# Patient Record
Sex: FEMALE | Race: WHITE | NOT HISPANIC OR LATINO | URBAN - METROPOLITAN AREA
[De-identification: names, ages, dates, MRNs, and addresses within clinical notes are randomized per-mention and may not be internally consistent; named-entity substitution may affect disease eponyms.]

---

## 2017-12-09 ENCOUNTER — EMERGENCY (EMERGENCY)
Facility: HOSPITAL | Age: 67
LOS: 1 days | Discharge: HOME | End: 2017-12-09
Admitting: EMERGENCY MEDICINE
Payer: MEDICARE

## 2017-12-09 VITALS
OXYGEN SATURATION: 100 % | SYSTOLIC BLOOD PRESSURE: 125 MMHG | RESPIRATION RATE: 18 BRPM | TEMPERATURE: 98 F | DIASTOLIC BLOOD PRESSURE: 71 MMHG | HEART RATE: 122 BPM

## 2017-12-09 VITALS
DIASTOLIC BLOOD PRESSURE: 68 MMHG | SYSTOLIC BLOOD PRESSURE: 108 MMHG | TEMPERATURE: 99 F | HEART RATE: 92 BPM | OXYGEN SATURATION: 100 % | RESPIRATION RATE: 17 BRPM

## 2017-12-09 LAB
ALBUMIN SERPL ELPH-MCNC: 3.8 G/DL — SIGNIFICANT CHANGE UP (ref 3.4–5)
ALP SERPL-CCNC: 83 U/L — SIGNIFICANT CHANGE UP (ref 40–120)
ALT FLD-CCNC: 28 U/L — SIGNIFICANT CHANGE UP (ref 12–42)
ANION GAP SERPL CALC-SCNC: 8 MMOL/L — LOW (ref 9–16)
APTT BLD: 31.9 SEC — SIGNIFICANT CHANGE UP (ref 27.5–36.5)
AST SERPL-CCNC: 33 U/L — SIGNIFICANT CHANGE UP (ref 15–37)
BILIRUB SERPL-MCNC: 0.8 MG/DL — SIGNIFICANT CHANGE UP (ref 0.2–1.2)
BUN SERPL-MCNC: 12 MG/DL — SIGNIFICANT CHANGE UP (ref 7–23)
CALCIUM SERPL-MCNC: 9 MG/DL — SIGNIFICANT CHANGE UP (ref 8.5–10.5)
CHLORIDE SERPL-SCNC: 98 MMOL/L — SIGNIFICANT CHANGE UP (ref 96–108)
CK MB BLD-MCNC: 0.72 % — SIGNIFICANT CHANGE UP
CK MB CFR SERPL CALC: 0.6 NG/ML — SIGNIFICANT CHANGE UP (ref 0.5–3.6)
CO2 SERPL-SCNC: 25 MMOL/L — SIGNIFICANT CHANGE UP (ref 22–31)
CREAT SERPL-MCNC: 0.72 MG/DL — SIGNIFICANT CHANGE UP (ref 0.5–1.3)
D DIMER BLD IA.RAPID-MCNC: 182 NG/ML DDU — SIGNIFICANT CHANGE UP
GLUCOSE SERPL-MCNC: 125 MG/DL — HIGH (ref 70–99)
HCT VFR BLD CALC: 33.9 % — LOW (ref 34.5–45)
HGB BLD-MCNC: 11.9 G/DL — SIGNIFICANT CHANGE UP (ref 11.5–15.5)
INR BLD: 1.09 — SIGNIFICANT CHANGE UP (ref 0.88–1.16)
MCHC RBC-ENTMCNC: 31.7 PG — SIGNIFICANT CHANGE UP (ref 27–34)
MCHC RBC-ENTMCNC: 35.1 G/DL — SIGNIFICANT CHANGE UP (ref 32–36)
MCV RBC AUTO: 90.4 FL — SIGNIFICANT CHANGE UP (ref 80–100)
PLATELET # BLD AUTO: 222 K/UL — SIGNIFICANT CHANGE UP (ref 150–400)
POTASSIUM SERPL-MCNC: 3.8 MMOL/L — SIGNIFICANT CHANGE UP (ref 3.5–5.3)
POTASSIUM SERPL-SCNC: 3.8 MMOL/L — SIGNIFICANT CHANGE UP (ref 3.5–5.3)
PROT SERPL-MCNC: 7.4 G/DL — SIGNIFICANT CHANGE UP (ref 6.4–8.2)
PROTHROM AB SERPL-ACNC: 12 SEC — SIGNIFICANT CHANGE UP (ref 9.8–12.7)
RBC # BLD: 3.75 M/UL — LOW (ref 3.8–5.2)
RBC # FLD: 12.5 % — SIGNIFICANT CHANGE UP (ref 10.3–16.9)
SODIUM SERPL-SCNC: 131 MMOL/L — LOW (ref 132–145)
TROPONIN I SERPL-MCNC: <0.017 NG/ML — LOW (ref 0.02–0.06)
WBC # BLD: 12.2 K/UL — HIGH (ref 3.8–10.5)
WBC # FLD AUTO: 12.2 K/UL — HIGH (ref 3.8–10.5)

## 2017-12-09 PROCEDURE — 99284 EMERGENCY DEPT VISIT MOD MDM: CPT | Mod: 25

## 2017-12-09 PROCEDURE — 93010 ELECTROCARDIOGRAM REPORT: CPT

## 2017-12-09 PROCEDURE — 71020: CPT | Mod: 26

## 2017-12-09 RX ORDER — AZITHROMYCIN 500 MG/1
1 TABLET, FILM COATED ORAL
Qty: 3 | Refills: 0 | OUTPATIENT
Start: 2017-12-09 | End: 2017-12-12

## 2017-12-09 RX ORDER — ASPIRIN/CALCIUM CARB/MAGNESIUM 324 MG
325 TABLET ORAL ONCE
Qty: 0 | Refills: 0 | Status: COMPLETED | OUTPATIENT
Start: 2017-12-09 | End: 2017-12-09

## 2017-12-09 RX ORDER — AZITHROMYCIN 500 MG/1
500 TABLET, FILM COATED ORAL ONCE
Qty: 0 | Refills: 0 | Status: COMPLETED | OUTPATIENT
Start: 2017-12-09 | End: 2017-12-09

## 2017-12-09 RX ADMIN — Medication 325 MILLIGRAM(S): at 10:10

## 2017-12-09 RX ADMIN — AZITHROMYCIN 500 MILLIGRAM(S): 500 TABLET, FILM COATED ORAL at 12:35

## 2017-12-09 NOTE — ED ADULT NURSE REASSESSMENT NOTE - NS ED NURSE REASSESS COMMENT FT1
Pt EKG was initially performed at 0957, but incorrect ID number was entered. EKG is properly relabeled and scanned into chart.

## 2017-12-09 NOTE — ED PROVIDER NOTE - MUSCULOSKELETAL, MLM
Spine appears normal, range of motion is not limited, no muscle or joint tenderness, no pedal edema, no calf tenderness.

## 2017-12-09 NOTE — ED ADULT TRIAGE NOTE - CHIEF COMPLAINT QUOTE
Pt came home from [place] yesterday. At around 11pm she began to have 9/10 pressure on her chest with shortness of breath. Also has back soreness.

## 2017-12-09 NOTE — ED PROVIDER NOTE - OBJECTIVE STATEMENT
66 yo F w/ history of primary biliary cirrhosis and raynaud's c/o substernal chest pain, squeezing in nature, since yesterday at 7pm. pt notes chest pain worsened on deep inspiration. no fever, chills, cough, SOB. Pt recently returned yesterday evening from trip to [place] 66 yo F w/ history of primary biliary cirrhosis and raynaud's c/o substernal chest pain, squeezing in nature, since yesterday at 7pm. pt notes chest pain worsened on deep inspiration. no fever, chills, cough, SOB, no abdominal pain, N/V. Pt recently returned yesterday evening from trip to [place]. FHx: CAD in early 50s. 68 yo F w/ history of primary biliary cirrhosis and raynaud's c/o substernal chest pain, squeezing in nature, worsened on deep breathing since yesterday at 7pm. pt notes chest pain worsened on deep inspiration. no fever, chills, cough, SOB, no abdominal pain, N/V. Pt recently returned yesterday evening from trip to [place]. FHx: CAD in early 50s.

## 2017-12-13 DIAGNOSIS — J18.9 PNEUMONIA, UNSPECIFIED ORGANISM: ICD-10-CM

## 2017-12-13 DIAGNOSIS — R07.89 OTHER CHEST PAIN: ICD-10-CM

## 2021-09-21 PROBLEM — I73.00 RAYNAUD'S SYNDROME WITHOUT GANGRENE: Chronic | Status: ACTIVE | Noted: 2017-12-09

## 2021-09-30 ENCOUNTER — TRANSCRIPTION ENCOUNTER (OUTPATIENT)
Age: 71
End: 2021-09-30

## 2021-09-30 ENCOUNTER — APPOINTMENT (OUTPATIENT)
Dept: VASCULAR SURGERY | Facility: CLINIC | Age: 71
End: 2021-09-30
Payer: MEDICARE

## 2021-09-30 VITALS
OXYGEN SATURATION: 99 % | BODY MASS INDEX: 20.77 KG/M2 | HEART RATE: 73 BPM | TEMPERATURE: 97.7 F | DIASTOLIC BLOOD PRESSURE: 70 MMHG | HEIGHT: 61 IN | SYSTOLIC BLOOD PRESSURE: 164 MMHG | WEIGHT: 110 LBS

## 2021-09-30 DIAGNOSIS — I83.10 VARICOSE VEINS OF UNSPECIFIED LOWER EXTREMITY WITH INFLAMMATION: ICD-10-CM

## 2021-09-30 DIAGNOSIS — I83.893 VARICOSE VEINS OF BILATERAL LOWER EXTREMITIES WITH OTHER COMPLICATIONS: ICD-10-CM

## 2021-09-30 DIAGNOSIS — I83.813 VARICOSE VEINS OF BILATERAL LOWER EXTREMITIES WITH PAIN: ICD-10-CM

## 2021-09-30 PROBLEM — Z00.00 ENCOUNTER FOR PREVENTIVE HEALTH EXAMINATION: Status: ACTIVE | Noted: 2021-09-30

## 2021-09-30 PROCEDURE — 93970 EXTREMITY STUDY: CPT

## 2021-09-30 PROCEDURE — 99203 OFFICE O/P NEW LOW 30 MIN: CPT | Mod: 25

## 2021-09-30 NOTE — PHYSICAL EXAM
[JVD] : no jugular venous distention  [2+] : left 2+ [Ankle Swelling (On Exam)] : not present [Varicose Veins Of Lower Extremities] : bilaterally [Ankle Swelling Bilaterally] : severe [] : bilaterally [Ankle Swelling On The Left] : moderate [No Rash or Lesion] : No rash or lesion [Purpura] : no purpura  [Petechiae] : no petechiae [Skin Ulcer] : no ulcer [Skin Induration] : no induration [Alert] : alert [Oriented to Person] : oriented to person [Oriented to Place] : oriented to place [Oriented to Time] : oriented to time [Calm] : calm [de-identified] : thin fit wdwn nad [de-identified] : wnl [de-identified] : emeraldl [FreeTextEntry1] : large branch veins both legs [de-identified] : wnl [de-identified] : as above

## 2021-09-30 NOTE — REASON FOR VISIT
[Initial Evaluation] : an initial evaluation [FreeTextEntry1] : last saw pt > 12 years ago at [facility] - performed b/l gsv evlt a that time with stab phlebectomy for severe venous reflux ds - pt returns now with c/o progressively worsening b/l leg severe venous reflux sx with large branch varicose veins both legs - ache cramps throbbing fatigue heaivness.

## 2021-09-30 NOTE — PROCEDURE
[FreeTextEntry1] : Vascular surgeon discussed with the patient:\par Varicose veins are enlarged, twisted veins. Varicose veins are caused by increased blood pressure in the veins.  The blood moves towards the heart by 1-way valves in the veins. When the valves become weakened or damaged, blood can collect in the veins. This causes the veins to become enlarged. Sitting or standing for long periods can cause blood to pool in the leg veins, increasing the pressure within the veins. The veins can stretch from the increased pressure. This may weaken the walls of the veins and damage the valves.\par Varicose veins may be more common in some families (inherited).  Factors that may increase pressure include:  obesity, older age, being female, pregnancy, taking oral contraceptive pills or hormone replacement, being inactive, and/or smoking. \par The most common symptoms of varicose veins are sensations in the legs, such as a heavy feeling, burning, and/or aching. However, each individual may experience symptoms differently.  Other symptoms may include:  color changes in the skin, sores on the legs, or rash.  Severe varicose veins may eventually produce long-term mild swelling that can result in more serious skin and tissue problems, such as ulcers and non-healing sores.\par Varicose veins are diagnosed by a complete medical history, physical examination, and diagnostic studies for varicose veins including duplex ultrasound and color-flow imaging.  \par Medical treatment for varicose veins include:  compression stockings, sclerotherapy, endovenous laser ablation and/or surgical treatment microphlebectomy.  \par \par \par Rx given for thigh high support hose 20-30 mmHg; pt to call to schedule rt gsv evlt

## 2021-09-30 NOTE — DATA REVIEWED
[FreeTextEntry1] : b/l vle performed by rvt shows - rt leg distal gsv reflux,proximal rt gsv and left gsv obliterated s/p past evlt - large diffuse branch veins both legs; no lsv reflux, no dvt no svt

## 2021-10-18 ENCOUNTER — TRANSCRIPTION ENCOUNTER (OUTPATIENT)
Age: 71
End: 2021-10-18

## 2021-10-19 ENCOUNTER — APPOINTMENT (OUTPATIENT)
Dept: VASCULAR SURGERY | Facility: CLINIC | Age: 71
End: 2021-10-19
Payer: MEDICARE

## 2021-10-19 VITALS
OXYGEN SATURATION: 99 % | TEMPERATURE: 97.2 F | SYSTOLIC BLOOD PRESSURE: 162 MMHG | HEART RATE: 84 BPM | DIASTOLIC BLOOD PRESSURE: 82 MMHG

## 2021-10-19 PROCEDURE — 36478 ENDOVENOUS LASER 1ST VEIN: CPT | Mod: RT

## 2021-10-19 NOTE — PROCEDURE
[FreeTextEntry3] : Right gsv endovenous laser ablation. \par \par Right Lower extremity varicose veins with inflammation, leg pain, leg swelling, and leg cramping.  Venous insufficiency, chronic venous hypertension and venous reflux.\par \par Ms. GLENDA BLACK is a 71 year year old F with a history of right lower extremity varicose veins previously seen in the office.  Ultrasound examination demonstrated reflux in the right gsv vein dumping into the patient’s varicosities in the right leg.  A trial of compression stockings, exercise, elevation, and pain medication was attempted without relief and as such, this patient was offered endovenous laser ablation therapy.  After explaining risks and benefits, informed consent was obtained and the patient agreed to proceed.  \par \par The patient was escorted to the procedure room and placed in the supine position on the examination table.  Laser safety glasses were placed on the patient.  The right leg was prepped and draped in the usual sterile fashion and time-out was called.  A sonographic probe was placed into a sterile sheath and the right lower extremity was imaged.  The right greater saphenous vein was identified under sonographic guidance and 1 mL of 1% lidocaine was administered subcutaneously using a 25G needle.  Using standard Seldinger technique, access to the right greater saphenous vein with the needle and corresponding guidewire was obtained.  The 4Fr Henrik-Sheath introducer was advanced over the wire to the treatment location.  The position of the sheath tip is 2 cm below the Sapheno-Femoral Junction and verified using ultrasound guidance.  Dilator and guidewire removed.   NeverTouch laser fiber inserted into the sheath until the Fiber Stop Assembly came in contact with the end of the Sheath Hub.  Fiber stop assembly removed and pulled the sheath back and locked to the Sheath-Kt fitting.  Fiber location confirmed using ultrasound guidance.\par \par Local tumescent anesthesia under ultrasound guidance was administered to ensure delivery of just enough anesthesia, approximately 250 mL, to achieve thermal protection.\par Anesthesia:  Tumescent anesthesia.  Tumescent anesthesia:  250 mL 0.9% Sodium Chloride for injection poured into sterile blue basin and added 83 mL sterile injectable lidocaine 1% (without Epinephrine) using 20 mL syringe.\par \par Laser was set to continuous mode and adjusted to desired power.  Laser placed in Enable mode.  The laser was activated by depressing the foot pedal while withdrawing the fiber and sheath together at a rate of 1 cm per 5 seconds.  The operating of the laser was ceased by removing the foot from foot pedal when the fiber end was 2 - 3 cm from the access site as indicated by markers on the distal tip of the Henrik-Sheath Introducer.   The sheath and laser fiber was removed and manual pressure applied at access site for 5 minutes.  Post procedure, the vein was imaged and showed successful closure of the right greater saphenous vein.  Dry dressing applied to access site, compression stocking 20 - 30 mm Hg applied to treated extremity.  The patient tolerated the procedure well with no complications.  Vital signs stable, patient was monitored throughout procedure.  The patient was escorted to the changing room.  \par \par Refer to procedure sheet for procedure start and end time, and harvey, length, energy and time documentation. \par \par Post-Op Instructions:  The following instructions were reviewed with the patient and a print out of the instructions provided to patient at time of visit:   1)  Compression stocking to be worn 24 hours per day x 7 days.  2)  Do not get the stocking wet for the first 3 days.  3)  Ambulation immediately following procedure and as much as possible for the first 7 days.  4)  Contact the office if any questions or concerns. 5)  Patient must follow-up within the first week for post procedure reflux study performed in office.  Reviewed with the patient the follow up visit is to ensure that there are no complications such as a deep vein thrombosis (DVT).  Patient acknowledged understanding of post-op instructions and was provided with print out of instructions at time of visit.  \par \par Reviewed with the patient post-procedure endovenous laser ablation (EVLT) instructions, provided patient with printed copy of instructions along with Dr. Omalley’s cell phone number:  [phone], and advised patient to call for any questions or concerns prior to follow up visit.  All questions answered. \par \par

## 2021-10-19 NOTE — REASON FOR VISIT
[Procedure: _________] : a [unfilled] procedure visit [FreeTextEntry1] : Patient has severe reflux right gsv and here for right gsv EVLT procedure.  GLENDA BLACK proceeded with right gsv EVLT with no complications.  GLENDA BLACK will follow up in one week for routine visit with VLE.\par

## 2021-10-26 ENCOUNTER — APPOINTMENT (OUTPATIENT)
Dept: VASCULAR SURGERY | Facility: CLINIC | Age: 71
End: 2021-10-26
Payer: MEDICARE

## 2021-10-26 VITALS
HEART RATE: 77 BPM | SYSTOLIC BLOOD PRESSURE: 149 MMHG | DIASTOLIC BLOOD PRESSURE: 74 MMHG | TEMPERATURE: 97.3 F | OXYGEN SATURATION: 99 %

## 2021-10-26 PROCEDURE — 99213 OFFICE O/P EST LOW 20 MIN: CPT | Mod: 25

## 2021-10-26 PROCEDURE — 93971 EXTREMITY STUDY: CPT

## 2021-10-26 NOTE — REASON FOR VISIT
[Follow-Up: _____] : a [unfilled] follow-up visit [FreeTextEntry1] : She is s/p right gsv EVLT.  Patient is here today for routine one week follow up visit with ultrasound.  Right leg ultrasound confirms no dvt, no HIIT, no truncal reflux and right gsv is closed as desired. Pt to return for left and right stab phlebectomy to remove large painful varicose veins both legs.\par

## 2021-10-28 NOTE — PHYSICAL EXAM
[2+] : left 2+ [Varicose Veins Of Lower Extremities] : bilaterally [Ankle Swelling Bilaterally] : severe [] : bilaterally [Ankle Swelling On The Left] : moderate [No Rash or Lesion] : No rash or lesion [Alert] : alert [Oriented to Person] : oriented to person [Oriented to Place] : oriented to place [Oriented to Time] : oriented to time [Calm] : calm [JVD] : no jugular venous distention  [Ankle Swelling (On Exam)] : not present [Purpura] : no purpura  [Petechiae] : no petechiae [Skin Ulcer] : no ulcer [Skin Induration] : no induration [de-identified] : thin fit wdwn nad [de-identified] : wnl [de-identified] : emeraldl [FreeTextEntry1] : large branch veins both legs [de-identified] : wnl [de-identified] : as above

## 2021-10-28 NOTE — HISTORY OF PRESENT ILLNESS
[FreeTextEntry1] : She is s/p right gsv EVLT.  Patient is here today for routine one week follow up visit with ultrasound.  Right leg ultrasound confirms no dvt, no HIIT, no truncal reflux and right gsv is closed as desired.\par \par Pt to return for left leg and right leg stab phlebectomy for large, painful branch varicose veins.\par \par \par

## 2021-10-28 NOTE — DATA REVIEWED
[FreeTextEntry1] : RVT performed rt vle s/p rt gsv evlt - vein closed no dvt no reflux sf junction compressible no EHIIT

## 2021-10-28 NOTE — PHYSICAL EXAM
[2+] : left 2+ [Varicose Veins Of Lower Extremities] : bilaterally [Ankle Swelling Bilaterally] : severe [] : bilaterally [Ankle Swelling On The Left] : moderate [No Rash or Lesion] : No rash or lesion [Alert] : alert [Oriented to Person] : oriented to person [Oriented to Place] : oriented to place [Oriented to Time] : oriented to time [Calm] : calm [JVD] : no jugular venous distention  [Ankle Swelling (On Exam)] : not present [Purpura] : no purpura  [Petechiae] : no petechiae [Skin Ulcer] : no ulcer [Skin Induration] : no induration [de-identified] : thin fit wdwn nad [de-identified] : wnl [de-identified] : emeraldl [de-identified] : wnl [FreeTextEntry1] : large branch veins both legs [de-identified] : as above

## 2021-11-09 ENCOUNTER — APPOINTMENT (OUTPATIENT)
Dept: VASCULAR SURGERY | Facility: CLINIC | Age: 71
End: 2021-11-09
Payer: MEDICARE

## 2021-11-09 VITALS
HEART RATE: 88 BPM | TEMPERATURE: 97.7 F | DIASTOLIC BLOOD PRESSURE: 73 MMHG | OXYGEN SATURATION: 97 % | SYSTOLIC BLOOD PRESSURE: 142 MMHG

## 2021-11-09 PROCEDURE — 37765 STAB PHLEB VEINS XTR 10-20: CPT | Mod: LT

## 2021-11-09 NOTE — REASON FOR VISIT
[Procedure: _________] : a [unfilled] procedure visit [FreeTextEntry1] : Patient with painful dilated branch varicose veins left leg.  She is here for left leg microphlebectomy.  GLENDA BLACK proceeded with left anterior leg microphlebectomy without complications.  She will follow up in 2 weeks for routine visit and suture removal and right leg stab phlebectomy.\par

## 2021-11-09 NOTE — PROCEDURE
[FreeTextEntry3] : Left lower extremity varicose veins with pain, swelling, and venous insufficiency.\par  \par Varicose veins of the left lower extremity with inflammation.  Venous reflux/insufficiency, leg pain, limb swelling and cramps.  Chronic venous hypertension.\par \par Ms. GLENDA BLACK is a 71 year year old F with a history of left lower extremity varicose veins previously seen in the office.  Ultrasound examination demonstrated multiple varicosities in the patient’s left leg with venous reflux.  A trial of compression stockings, exercise, elevation, and pain medication was attempted without relief and as such, this patient was offered microphlebectomy therapy.  After explaining risks and benefits, informed consent was obtained and the patient agreed to proceed.  \par \par The patient was escorted to the procedure room.  The varicose veins for treatment were marked out and the patient agreed with the markings.  Patient was placed in the   []   position on the examination table.  The left leg was prepped and draped in the usual sterile fashion and time-out was called.  \par \par Local anesthesia was administered subcutaneously along the marked varicosities for treatment.  Anesthesia:  Local anesthesia 1% lidocaine without epinephrine.  Anesthesia is drawn up using 10 mL sterile syringe and 18G blunt fill needle, 10 mL sterile injectable lidocaine 1% (without Epinephrine) is drawn up.  18G blunt fill needle with 5 micron filter is removed and 25G 11/2 inch needle placed on syringe with medication for injection.  \par \par Total dose of lidocaine without epinephrine:  [] \par \par  A total of   []  micro incisions were made over the varicosities that were previously marked out and the veins were grasped using a combination of mosquitoes and forceps.  The veins were removed.  At completion, hemostasis was ensured with digital pressure at the stab sites.  Once complete, the incisions were appropriately closed with 4-0 Monocryl suture and steri-strips.  The left leg was appropriately dressed with 4 inch Kerlix and 4 inch latex-free Ace bandage.  The patient tolerated the procedure well with no complications.  Vital signs stable, patient was monitored throughout.  Patient was escorted to the changing room.  Specimen sent to pathology for evaluation.\par \par Post-Op Instructions:  The following instructions were reviewed with the patient and a hard copy of the instructions provided:   1)  Surgical dressing to remain on for 72 hours.   2)  Do not get the surgical dressing wet for the first 3 days.  3)  Ambulation immediately following procedure and as much as possible.  4)  Contact the office if any questions or concerns; follow-up 2 weeks post-op for evaluation and suture removal.  Patient acknowledged understanding of post-op instructions and was provided with print out of instructions at time of visit.  Provided patient with Dr. Omalley’s cell number:  [phone].  All questions answered.  \par \par

## 2021-11-16 LAB — CORE LAB BIOPSY: NORMAL

## 2021-11-23 ENCOUNTER — APPOINTMENT (OUTPATIENT)
Dept: VASCULAR SURGERY | Facility: CLINIC | Age: 71
End: 2021-11-23
Payer: MEDICARE

## 2021-11-23 VITALS
SYSTOLIC BLOOD PRESSURE: 149 MMHG | HEART RATE: 75 BPM | OXYGEN SATURATION: 98 % | TEMPERATURE: 96.9 F | DIASTOLIC BLOOD PRESSURE: 85 MMHG

## 2021-11-23 PROCEDURE — 37765 STAB PHLEB VEINS XTR 10-20: CPT | Mod: RT

## 2021-11-24 NOTE — REASON FOR VISIT
[Procedure: _________] : a [unfilled] procedure visit [FreeTextEntry1] : Patient with painful dilated branch varicose veins right leg.  She is here for right leg microphlebectomy.  GLENDA BLACK proceeded with right leg microphlebectomy without complications.  She will follow up in 2 weeks for routine visit and suture removal. She also underwent removal of left leg sutures s/p recent left anterior leg stab phlebectomy - excellent results and good wound healing. She still requires left posterior leg stab phlebectomy.\par

## 2021-11-24 NOTE — PROCEDURE
[FreeTextEntry3] : Right lower extremity varicose veins with pain, swelling, and venous insufficiency.\par  \par Varicose veins of the right lower extremity with inflammation.  Venous reflux/insufficiency, leg pain, limb swelling and cramps.  Chronic venous hypertension.\par \par Ms. GLENDA BLACK is a 71 year year old F with a history of right lower extremity varicose veins previously seen in the office.  Ultrasound examination demonstrated multiple varicosities in the patient’s right leg with venous reflux.  A trial of compression stockings, exercise, elevation, and pain medication was attempted without relief and as such, this patient was offered microphlebectomy therapy.  After explaining risks and benefits, informed consent was obtained and the patient agreed to proceed.  \par \par The patient was escorted to the procedure room.  The varicose veins for treatment were marked out and the patient agreed with the markings.  Patient was placed in the   []   position on the examination table.  The right leg was prepped and draped in the usual sterile fashion and time-out was called.  \par \par Local anesthesia was administered subcutaneously along the marked varicosities for treatment.  Anesthesia:  1% lidocaine without epinephrine.  18G blunt fill needle with 5 micron filter is removed and 25G 11/2 inch needle placed on syringe with medication for injection.  \par \par Total dose of lidocaine without epinephrine:  20 cc\par \par  A total of 10 micro incisions were made over the varicosities that were previously marked out and the veins were grasped using a combination of mosquitoes and forceps.  The veins were removed.  At completion, hemostasis was ensured with digital pressure at the stab sites.  Once complete, the incisions were appropriately closed with 4-0 Monocryl suture and steri-strips.  The right leg was appropriately dressed with 4 inch Kerlix and 4 inch latex-free Ace bandage.  The patient tolerated the procedure well with no complications.  Vital signs stable, patient was monitored throughout.  Patient was escorted to the changing room.  Specimen sent to pathology for evaluation.\par \par Post-Op Instructions:  The following instructions were reviewed with the patient and a hard copy of the instructions provided:   1)  Surgical dressing to remain on for 72 hours.   2)  Do not get the surgical dressing wet for the first 3 days.  3)  Ambulation immediately following procedure and as much as possible.  4)  Contact the office if any questions or concerns; follow-up 2 weeks post-op for evaluation and suture removal.  Patient acknowledged understanding of post-op instructions and was provided with print out of instructions at time of visit.  Provided patient with Dr. Omalley’s cell number:  [phone].  All questions answered.\par \par

## 2021-11-29 LAB — CORE LAB BIOPSY: NORMAL

## 2021-12-02 ENCOUNTER — APPOINTMENT (OUTPATIENT)
Dept: VASCULAR SURGERY | Facility: CLINIC | Age: 71
End: 2021-12-02
Payer: MEDICARE

## 2021-12-02 VITALS
TEMPERATURE: 96.9 F | OXYGEN SATURATION: 96 % | DIASTOLIC BLOOD PRESSURE: 71 MMHG | SYSTOLIC BLOOD PRESSURE: 136 MMHG | HEART RATE: 87 BPM

## 2021-12-02 PROCEDURE — 99024 POSTOP FOLLOW-UP VISIT: CPT

## 2021-12-02 NOTE — REASON FOR VISIT
[Follow-Up: _____] : a [unfilled] follow-up visit [FreeTextEntry1] : status post right leg microphlebectomy.  Patient is here for routine 2 week post-operative follow-up and suture removal.    Sutures removed,  steri-strips placed.   Incisions healing well without signs or symptoms of infection.  Instructed patient to protect incisions from the sun with use of sunscreen.  Pt will return for left posterior leg stab phlebectomy on 1/25/22.\par

## 2021-12-02 NOTE — PHYSICAL EXAM
[JVD] : no jugular venous distention  [2+] : left 2+ [Ankle Swelling (On Exam)] : not present [Varicose Veins Of Lower Extremities] : bilaterally [Ankle Swelling Bilaterally] : severe [] : bilaterally [Ankle Swelling On The Left] : moderate [No Rash or Lesion] : No rash or lesion [Purpura] : no purpura  [Petechiae] : no petechiae [Skin Ulcer] : no ulcer [Skin Induration] : no induration [Alert] : alert [Oriented to Person] : oriented to person [Oriented to Place] : oriented to place [Oriented to Time] : oriented to time [Calm] : calm [de-identified] : thin fit wdwn nad [de-identified] : wnl [de-identified] : emeraldl [FreeTextEntry1] : well healed incisions right leg s/p recent stab phlebectomy - no cellulitis [de-identified] : wnl [de-identified] : as above

## 2022-01-25 ENCOUNTER — APPOINTMENT (OUTPATIENT)
Dept: VASCULAR SURGERY | Facility: CLINIC | Age: 72
End: 2022-01-25
Payer: MEDICARE

## 2022-01-25 VITALS
HEART RATE: 95 BPM | OXYGEN SATURATION: 98 % | SYSTOLIC BLOOD PRESSURE: 162 MMHG | DIASTOLIC BLOOD PRESSURE: 89 MMHG | TEMPERATURE: 97.8 F

## 2022-01-25 PROCEDURE — 37765 STAB PHLEB VEINS XTR 10-20: CPT | Mod: LT

## 2022-01-25 NOTE — REASON FOR VISIT
[Procedure: _________] : a [unfilled] procedure visit [FreeTextEntry1] : Patient with painful dilated branch varicose veins left leg.  She is here for left leg microphlebectomy.  GLENDA BLACK proceeded with left leg/posterior calf microphlebectomy without complications.  She will follow up in 2 weeks for routine visit and suture removal.\par

## 2022-01-25 NOTE — PROCEDURE
[FreeTextEntry3] : Left lower extremity varicose veins with pain, swelling, and venous insufficiency.\par  \par Varicose veins of the left lower extremity with inflammation.  Venous reflux/insufficiency, leg pain, limb swelling and cramps.  Chronic venous hypertension.\par \par Ms. GLENDA BLACK is a 71 year year old F with a history of left lower extremity varicose veins previously seen in the office.  Ultrasound examination demonstrated multiple varicosities in the patient’s left leg with venous reflux.  A trial of compression stockings, exercise, elevation, and pain medication was attempted without relief and as such, this patient was offered microphlebectomy therapy.  After explaining risks and benefits, informed consent was obtained and the patient agreed to proceed.  \par \par The patient was escorted to the procedure room.  The varicose veins for treatment were marked out and the patient agreed with the markings.  Patient was placed in the   prone position on the examination table.  The left leg was prepped and draped in the usual sterile fashion and time-out was called.  \par \par Local anesthesia was administered subcutaneously along the marked varicosities for treatment.  Anesthesia:  Local anesthesia 1% lidocaine without epinephrine.  Anesthesia is drawn up using 10 mL sterile syringe and 18G blunt fill needle, 10 mL sterile injectable lidocaine 1% (without Epinephrine) is drawn up.  18G blunt fill needle with 5 micron filter is removed and 25G 11/2 inch needle placed on syringe with medication for injection.  \par \par Total dose of lidocaine without epinephrine:10 cc\par \par  A total of   11  micro incisions were made over the varicosities that were previously marked out and the veins were grasped using a combination of mosquitoes and forceps.  The veins were removed.  At completion, hemostasis was ensured with digital pressure at the stab sites.  Once complete, the incisions were appropriately closed with 4-0 Monocryl suture and steri-strips.  The left leg was appropriately dressed with 4 inch Kerlix and 4 inch latex-free Ace bandage.  The patient tolerated the procedure well with no complications.  Vital signs stable, patient was monitored throughout.  Patient was escorted to the changing room.  Specimen sent to pathology for evaluation.\par \par Post-Op Instructions:  The following instructions were reviewed with the patient and a hard copy of the instructions provided:   1)  Surgical dressing to remain on for 72 hours.   2)  Do not get the surgical dressing wet for the first 3 days.  3)  Ambulation immediately following procedure and as much as possible.  4)  Contact the office if any questions or concerns; follow-up 2 weeks post-op for evaluation and suture removal.  Patient acknowledged understanding of post-op instructions and was provided with print out of instructions at time of visit.  Provided patient with Dr. Omalley’s cell number:  [phone].  All questions answered.  \par \par

## 2022-02-01 LAB — CORE LAB BIOPSY: NORMAL

## 2022-02-08 ENCOUNTER — APPOINTMENT (OUTPATIENT)
Dept: VASCULAR SURGERY | Facility: CLINIC | Age: 72
End: 2022-02-08
Payer: MEDICARE

## 2022-02-08 VITALS
SYSTOLIC BLOOD PRESSURE: 159 MMHG | HEART RATE: 84 BPM | DIASTOLIC BLOOD PRESSURE: 79 MMHG | TEMPERATURE: 97.3 F | OXYGEN SATURATION: 98 %

## 2022-02-08 PROCEDURE — 99213 OFFICE O/P EST LOW 20 MIN: CPT

## 2022-02-08 NOTE — REASON FOR VISIT
[Follow-Up: _____] : a [unfilled] follow-up visit [FreeTextEntry1] : status post left leg microphlebectomy.  Patient is here for routine 2 week post-operative follow-up and suture removal.    Sutures removed, steri-strips placed.   Incisions healing well without signs or symptoms of infection.  Instructed patient to protect incisions from the sun with use of sunscreen.   Pt will return for left anterolateral leg stab phlebectomy for large tortuous extensive branch varicose veins on 2/22/22.\par

## 2022-02-08 NOTE — PHYSICAL EXAM
[JVD] : no jugular venous distention  [2+] : left 2+ [Ankle Swelling (On Exam)] : not present [Varicose Veins Of Lower Extremities] : bilaterally [Ankle Swelling Bilaterally] : severe [] : bilaterally [Ankle Swelling On The Left] : moderate [No Rash or Lesion] : No rash or lesion [Purpura] : no purpura  [Petechiae] : no petechiae [Skin Ulcer] : no ulcer [Skin Induration] : no induration [Alert] : alert [Oriented to Person] : oriented to person [Oriented to Place] : oriented to place [Oriented to Time] : oriented to time [Calm] : calm [de-identified] : thin fit wdwn nad [de-identified] : wnl [de-identified] : emeraldl [FreeTextEntry1] : well healed incisions eft posterior leg s/p recent stab phlebectomy - no cellulitis; extensive large tortuous varicose veins left anterolateral leg and calf [de-identified] : wnl [de-identified] : as above

## 2022-02-08 NOTE — PROCEDURE
[FreeTextEntry1] : pt to return for additional stab phlebectomy left anterolateral leg and calf 2/22/22.

## 2022-02-22 ENCOUNTER — APPOINTMENT (OUTPATIENT)
Dept: VASCULAR SURGERY | Facility: CLINIC | Age: 72
End: 2022-02-22
Payer: MEDICARE

## 2022-02-22 VITALS
SYSTOLIC BLOOD PRESSURE: 133 MMHG | DIASTOLIC BLOOD PRESSURE: 83 MMHG | OXYGEN SATURATION: 98 % | TEMPERATURE: 98.3 F | HEART RATE: 85 BPM

## 2022-02-22 DIAGNOSIS — I87.2 VENOUS INSUFFICIENCY (CHRONIC) (PERIPHERAL): ICD-10-CM

## 2022-02-22 PROCEDURE — 37765 STAB PHLEB VEINS XTR 10-20: CPT | Mod: LT

## 2022-02-22 NOTE — PROCEDURE
[FreeTextEntry3] : Left lower extremity varicose veins with pain, swelling, and venous insufficiency.\par  \par Varicose veins of the left lower extremity with inflammation.  Venous reflux/insufficiency, leg pain, limb swelling and cramps.  Chronic venous hypertension.\par \par Ms. GLENDA BLACK is a 71 year year old F with a history of left lower extremity varicose veins previously seen in the office.  Ultrasound examination demonstrated multiple varicosities in the patient’s left leg with venous reflux.  A trial of compression stockings, exercise, elevation, and pain medication was attempted without relief and as such, this patient was offered microphlebectomy therapy.  After explaining risks and benefits, informed consent was obtained and the patient agreed to proceed.  \par \par The patient was escorted to the procedure room.  The varicose veins for treatment were marked out and the patient agreed with the markings.  Patient was placed in the supine position on the examination table.  The left leg was prepped and draped in the usual sterile fashion and time-out was called.  \par \par Local anesthesia was administered subcutaneously along the marked varicosities for treatment.  Anesthesia:  Local anesthesia 1% lidocaine without epinephrine.  Anesthesia is drawn up using 10 mL sterile syringe and 18G blunt fill needle, 10 mL sterile injectable lidocaine 1% (without Epinephrine) is drawn up.  18G blunt fill needle with 5 micron filter is removed and 25G 11/2 inch needle placed on syringe with medication for injection.  \par \par Total dose of lidocaine without epinephrine:  20cc\par \par  A total of  15 micro incisions were made over the varicosities that were previously marked out and the veins were grasped using a combination of mosquitoes and forceps.  The veins were removed.  At completion, hemostasis was ensured with digital pressure at the stab sites.  Once complete, the incisions were appropriately closed with 4-0 Monocryl suture and steri-strips.  The left leg was appropriately dressed with 4 inch Kerlix and 4 inch latex-free Ace bandage.  The patient tolerated the procedure well with no complications.  Vital signs stable, patient was monitored throughout.  Patient was escorted to the changing room.  Specimen sent to pathology for evaluation.\par \par Post-Op Instructions:  The following instructions were reviewed with the patient and a hard copy of the instructions provided:   1)  Surgical dressing to remain on for 72 hours.   2)  Do not get the surgical dressing wet for the first 3 days.  3)  Ambulation immediately following procedure and as much as possible.  4)  Contact the office if any questions or concerns; follow-up 2 weeks post-op for evaluation and suture removal.  Patient acknowledged understanding of post-op instructions and was provided with print out of instructions at time of visit.  Provided patient with Dr. Omalley’s cell number:  [phone].  All questions answered.  \par \par

## 2022-03-09 ENCOUNTER — APPOINTMENT (OUTPATIENT)
Dept: VASCULAR SURGERY | Facility: CLINIC | Age: 72
End: 2022-03-09
Payer: MEDICARE

## 2022-03-09 VITALS
HEART RATE: 96 BPM | DIASTOLIC BLOOD PRESSURE: 79 MMHG | OXYGEN SATURATION: 99 % | TEMPERATURE: 97.4 F | SYSTOLIC BLOOD PRESSURE: 181 MMHG

## 2022-03-09 DIAGNOSIS — I83.12 VARICOSE VEINS OF LEFT LOWER EXTREMITY WITH INFLAMMATION: ICD-10-CM

## 2022-03-09 DIAGNOSIS — I83.812 VARICOSE VEINS OF LEFT LOWER EXTREMITY WITH PAIN: ICD-10-CM

## 2022-03-09 DIAGNOSIS — I83.892 VARICOSE VEINS OF LEFT LOWER EXTREMITY WITH OTHER COMPLICATIONS: ICD-10-CM

## 2022-03-09 PROCEDURE — 99213 OFFICE O/P EST LOW 20 MIN: CPT

## 2022-03-09 NOTE — REASON FOR VISIT
[Follow-Up: _____] : a [unfilled] follow-up visit [FreeTextEntry1] : status post left leg microphlebectomy.  Patient is here for routine 2 week post-operative follow-up and suture removal.    Sutures removed, steri-strips placed.   Incisions healing well without signs or symptoms of infection.  Instructed patient to protect incisions from the sun with use of sunscreen.  \par

## 2022-03-09 NOTE — PHYSICAL EXAM
[JVD] : no jugular venous distention  [2+] : left 2+ [Ankle Swelling (On Exam)] : not present [Varicose Veins Of Lower Extremities] : bilaterally [Ankle Swelling Bilaterally] : severe [] : bilaterally [No Rash or Lesion] : No rash or lesion [Ankle Swelling On The Left] : moderate [Purpura] : no purpura  [Petechiae] : no petechiae [Skin Ulcer] : no ulcer [Skin Induration] : no induration [Alert] : alert [Oriented to Person] : oriented to person [Oriented to Place] : oriented to place [Oriented to Time] : oriented to time [Calm] : calm [de-identified] : thin fit wdwn nad [de-identified] : wnl [de-identified] : emeraldl [FreeTextEntry1] : well healed incisions left anterior leg s/p recent stab phlebectomy -  [de-identified] : wnl [de-identified] : as above

## 2022-03-22 ENCOUNTER — APPOINTMENT (OUTPATIENT)
Dept: VASCULAR SURGERY | Facility: CLINIC | Age: 72
End: 2022-03-22
Payer: MEDICARE

## 2022-03-22 VITALS
SYSTOLIC BLOOD PRESSURE: 156 MMHG | OXYGEN SATURATION: 99 % | DIASTOLIC BLOOD PRESSURE: 77 MMHG | HEART RATE: 81 BPM | TEMPERATURE: 97.8 F

## 2022-03-22 PROCEDURE — 37765 STAB PHLEB VEINS XTR 10-20: CPT | Mod: RT

## 2022-03-22 NOTE — REASON FOR VISIT
[Procedure: _________] : a [unfilled] procedure visit [FreeTextEntry1] : Patient with painful dilated branch varicose veins right leg.  She is here for right leg microphlebectomy.  GLENDA BLACK proceeded with right leg microphlebectomy without complications.  She will follow up in 2 weeks for routine visit and suture removal.\par

## 2022-03-22 NOTE — PROCEDURE
[FreeTextEntry1] : rt anterior leg stab phlebectomy [FreeTextEntry3] : Right lower extremity varicose veins with pain, swelling, and venous insufficiency.\par  \par Varicose veins of the right lower extremity with inflammation.  Venous reflux/insufficiency, leg pain, limb swelling and cramps.  Chronic venous hypertension.\par \par Ms. GLENDA BLACK is a 71 year year old F with a history of right lower extremity varicose veins previously seen in the office.  Ultrasound examination demonstrated multiple varicosities in the patient’s right leg with venous reflux.  A trial of compression stockings, exercise, elevation, and pain medication was attempted without relief and as such, this patient was offered microphlebectomy therapy.  After explaining risks and benefits, informed consent was obtained and the patient agreed to proceed.  \par \par The patient was escorted to the procedure room.  The varicose veins for treatment were marked out and the patient agreed with the markings.  Patient was placed in the   []   position on the examination table.  The right leg was prepped and draped in the usual sterile fashion and time-out was called.  \par \par Local anesthesia was administered subcutaneously along the marked varicosities for treatment.  Anesthesia:  1% lidocaine without epinephrine.  18G blunt fill needle with 5 micron filter is removed and 25G 11/2 inch needle placed on syringe with medication for injection.  \par \par Total dose of lidocaine without epinephrine:  20 cc\par \par  A total of   11  micro incisions were made over the varicosities that were previously marked out and the veins were grasped using a combination of mosquitoes and forceps.  The veins were removed.  At completion, hemostasis was ensured with digital pressure at the stab sites.  Once complete, the incisions were appropriately closed with 4-0 Monocryl suture and steri-strips.  The right leg was appropriately dressed with 4 inch Kerlix and 4 inch latex-free Ace bandage.  The patient tolerated the procedure well with no complications.  Vital signs stable, patient was monitored throughout.  Patient was escorted to the changing room.  Specimen sent to pathology for evaluation.\par \par Post-Op Instructions:  The following instructions were reviewed with the patient and a hard copy of the instructions provided:   1)  Surgical dressing to remain on for 72 hours.   2)  Do not get the surgical dressing wet for the first 3 days.  3)  Ambulation immediately following procedure and as much as possible.  4)  Contact the office if any questions or concerns; follow-up 2 weeks post-op for evaluation and suture removal.  Patient acknowledged understanding of post-op instructions and was provided with print out of instructions at time of visit.  Provided patient with Dr. Omalley’s cell number:  [phone].  All questions answered.\par \par

## 2022-04-05 ENCOUNTER — APPOINTMENT (OUTPATIENT)
Dept: VASCULAR SURGERY | Facility: CLINIC | Age: 72
End: 2022-04-05
Payer: MEDICARE

## 2022-04-05 DIAGNOSIS — I83.891 VARICOSE VEINS OF RIGHT LOWER EXTREMITY WITH OTHER COMPLICATIONS: ICD-10-CM

## 2022-04-05 DIAGNOSIS — I83.11 VARICOSE VEINS OF RIGHT LOWER EXTREMITY WITH INFLAMMATION: ICD-10-CM

## 2022-04-05 DIAGNOSIS — I83.811 VARICOSE VEINS OF RIGHT LOWER EXTREMITY WITH PAIN: ICD-10-CM

## 2022-04-05 PROCEDURE — 99213 OFFICE O/P EST LOW 20 MIN: CPT

## 2022-04-05 NOTE — REASON FOR VISIT
[Follow-Up: _____] : a [unfilled] follow-up visit [FreeTextEntry1] : status post right leg microphlebectomy.  Patient is here for routine 2 week post-operative follow-up and suture removal.    Sutures removed,  steri-strips placed.   Incisions healing well without signs or symptoms of infection.  Instructed patient to protect incisions from the sun with use of sunscreen.  Pt will call after summer to schedule b/l sclerotherapy.\par

## 2022-04-05 NOTE — PHYSICAL EXAM
[JVD] : no jugular venous distention  [2+] : left 2+ [Ankle Swelling (On Exam)] : not present [Varicose Veins Of Lower Extremities] : bilaterally [Ankle Swelling Bilaterally] : severe [] : bilaterally [Ankle Swelling On The Left] : moderate [No Rash or Lesion] : No rash or lesion [Purpura] : no purpura  [Petechiae] : no petechiae [Skin Ulcer] : no ulcer [Skin Induration] : no induration [Alert] : alert [Oriented to Person] : oriented to person [Oriented to Place] : oriented to place [Oriented to Time] : oriented to time [Calm] : calm [de-identified] : thin fit wdwn nad [de-identified] : wnl [de-identified] : emeraldl [FreeTextEntry1] : well healed incisions right anterior leg s/p recent stab phlebectomy -  [de-identified] : wnl [de-identified] : as above

## 2022-09-26 LAB — CORE LAB BIOPSY: NORMAL

## 2023-02-03 ENCOUNTER — EMERGENCY (EMERGENCY)
Facility: HOSPITAL | Age: 73
LOS: 1 days | Discharge: HOME | End: 2023-02-03
Attending: EMERGENCY MEDICINE | Admitting: EMERGENCY MEDICINE
Payer: MEDICARE

## 2023-02-03 VITALS
HEART RATE: 82 BPM | OXYGEN SATURATION: 97 % | TEMPERATURE: 98 F | DIASTOLIC BLOOD PRESSURE: 85 MMHG | HEIGHT: 62 IN | WEIGHT: 110.01 LBS | SYSTOLIC BLOOD PRESSURE: 175 MMHG | RESPIRATION RATE: 18 BRPM

## 2023-02-03 PROCEDURE — 99284 EMERGENCY DEPT VISIT MOD MDM: CPT

## 2023-02-03 NOTE — ED ADULT NURSE NOTE - OBJECTIVE STATEMENT
Pt aox3 with steady gait. reports having blood work yesterday, had a call from doctor, potassium is 6.3, was referred to ED. as per pt, h/o hyperkalemia and hyponatremia. recent dx vasculitis in 10/2022 Denies chest pain or sob.

## 2023-02-03 NOTE — ED PROVIDER NOTE - CLINICAL SUMMARY MEDICAL DECISION MAKING FREE TEXT BOX
72-year-old female presents emergency hypokalemia concerning for laboratory error.  We will check in the emergency department CBC and CMP.  We will do screening EKG as well.

## 2023-02-03 NOTE — ED PROVIDER NOTE - PROGRESS NOTE DETAILS
Repeat labs demonstrated potassium of 5.4 and sodium of 131. pt has normal ekg. discussed results with pt and she will fu with her pcp. no acute intervention at this time. DC home

## 2023-02-03 NOTE — ED PROVIDER NOTE - OBJECTIVE STATEMENT
Potassium 6.3 and Sodium 132 71 yo F with PMG of Autoimmune disorders including crest syndrome, Raynaud's, PRC, Sjogren's syndrome, hypothyroidism presents emergency department for hyperkalemia and hyponatremia.  Patient states she had routine blood work done yesterday which demonstrated potassium of 6.3 and a sodium of 132.  Patient's not having any chest pain shortness of breath nausea vomiting fever chills.  Patient follows up with any physicians.

## 2023-02-03 NOTE — ED ADULT TRIAGE NOTE - CHIEF COMPLAINT QUOTE
reports having blood work yesterday, had a call from doctor, potassium is 6.3, was referred to ED. as per pt, h/o hyperkalemia and hyponatremia. recent dx vasculitis in 10/2022

## 2023-02-03 NOTE — ED PROVIDER NOTE - PATIENT PORTAL LINK FT
You can access the FollowMyHealth Patient Portal offered by [facility] by registering at the following website: [email or web address]. By joining [employer]’s FollowMyHealth portal, you will also be able to view your health information using other applications (apps) compatible with our system.

## 2023-02-06 DIAGNOSIS — M35.00 SJOGREN SYNDROME, UNSPECIFIED: ICD-10-CM

## 2023-02-06 DIAGNOSIS — Z01.812 ENCOUNTER FOR PREPROCEDURAL LABORATORY EXAMINATION: ICD-10-CM

## 2023-02-06 DIAGNOSIS — E87.6 HYPOKALEMIA: ICD-10-CM

## 2023-02-06 DIAGNOSIS — I73.00 RAYNAUD'S SYNDROME WITHOUT GANGRENE: ICD-10-CM

## 2023-02-06 DIAGNOSIS — E03.9 HYPOTHYROIDISM, UNSPECIFIED: ICD-10-CM

## 2023-02-06 DIAGNOSIS — E87.5 HYPERKALEMIA: ICD-10-CM

## 2024-01-24 NOTE — ED PROVIDER NOTE - CPE EDP SKIN NORM
----- Message from Lita Ko sent at 1/24/2024  8:44 AM CST -----  Regarding: order  Contact: [phone]  Dr. Yesenia Marquez calling requesting an order for robotic bronchoscopy  that was scheduled and pt no showed on 10/27/23.  She want to know if Dr. Navas could reorder this test for pt.  She is Pt PCP.  Dr. Montero Cell phone # [phone].  Please call pt's sister Ms Nathan to [phone]     normal...

## 2024-05-31 NOTE — REASON FOR VISIT
[Procedure: _________] : a [unfilled] procedure visit [FreeTextEntry1] : Patient with painful dilated branch varicose veins left leg.  She is here for left leg microphlebectomy.  GLENDA BLACK proceeded with left leg microphlebectomy without complications.  She will follow up in 2 weeks for routine visit and suture removal.\par  (+) bolus holding

## 2024-09-06 ENCOUNTER — EMERGENCY (EMERGENCY)
Facility: HOSPITAL | Age: 74
LOS: 1 days | Discharge: HOME | End: 2024-09-06
Attending: EMERGENCY MEDICINE | Admitting: EMERGENCY MEDICINE
Payer: MEDICARE

## 2024-09-06 VITALS
DIASTOLIC BLOOD PRESSURE: 84 MMHG | HEART RATE: 72 BPM | WEIGHT: 130.07 LBS | SYSTOLIC BLOOD PRESSURE: 148 MMHG | OXYGEN SATURATION: 99 % | RESPIRATION RATE: 18 BRPM | TEMPERATURE: 98 F | HEIGHT: 64 IN

## 2024-09-06 DIAGNOSIS — R04.0 EPISTAXIS: ICD-10-CM

## 2024-09-06 DIAGNOSIS — M34.1 CR(E)ST SYNDROME: ICD-10-CM

## 2024-09-06 DIAGNOSIS — E03.9 HYPOTHYROIDISM, UNSPECIFIED: ICD-10-CM

## 2024-09-06 DIAGNOSIS — I73.00 RAYNAUD'S SYNDROME WITHOUT GANGRENE: ICD-10-CM

## 2024-09-06 DIAGNOSIS — M35.00 SJOGREN SYNDROME, UNSPECIFIED: ICD-10-CM

## 2024-09-06 LAB
ALBUMIN SERPL ELPH-MCNC: 3.5 G/DL — SIGNIFICANT CHANGE UP (ref 3.4–5)
ALP SERPL-CCNC: 83 U/L — SIGNIFICANT CHANGE UP (ref 40–120)
ALT FLD-CCNC: 15 U/L — SIGNIFICANT CHANGE UP (ref 12–42)
ANION GAP SERPL CALC-SCNC: 5 MMOL/L — LOW (ref 9–16)
APTT BLD: 32.1 SEC — SIGNIFICANT CHANGE UP (ref 24.5–35.6)
AST SERPL-CCNC: 28 U/L — SIGNIFICANT CHANGE UP (ref 15–37)
BASOPHILS # BLD AUTO: 0.06 K/UL — SIGNIFICANT CHANGE UP (ref 0–0.2)
BASOPHILS NFR BLD AUTO: 0.8 % — SIGNIFICANT CHANGE UP (ref 0–2)
BILIRUB SERPL-MCNC: 0.4 MG/DL — SIGNIFICANT CHANGE UP (ref 0.2–1.2)
BUN SERPL-MCNC: 13 MG/DL — SIGNIFICANT CHANGE UP (ref 7–23)
CALCIUM SERPL-MCNC: 9 MG/DL — SIGNIFICANT CHANGE UP (ref 8.5–10.5)
CHLORIDE SERPL-SCNC: 100 MMOL/L — SIGNIFICANT CHANGE UP (ref 96–108)
CO2 SERPL-SCNC: 28 MMOL/L — SIGNIFICANT CHANGE UP (ref 22–31)
CREAT SERPL-MCNC: 0.63 MG/DL — SIGNIFICANT CHANGE UP (ref 0.5–1.3)
EGFR: 92 ML/MIN/1.73M2 — SIGNIFICANT CHANGE UP
EOSINOPHIL # BLD AUTO: 0.38 K/UL — SIGNIFICANT CHANGE UP (ref 0–0.5)
EOSINOPHIL NFR BLD AUTO: 5 % — SIGNIFICANT CHANGE UP (ref 0–6)
GLUCOSE SERPL-MCNC: 96 MG/DL — SIGNIFICANT CHANGE UP (ref 70–99)
HCT VFR BLD CALC: 35.2 % — SIGNIFICANT CHANGE UP (ref 34.5–45)
HGB BLD-MCNC: 12 G/DL — SIGNIFICANT CHANGE UP (ref 11.5–15.5)
IMM GRANULOCYTES NFR BLD AUTO: 0.3 % — SIGNIFICANT CHANGE UP (ref 0–0.9)
INR BLD: 1.05 — SIGNIFICANT CHANGE UP (ref 0.85–1.18)
LYMPHOCYTES # BLD AUTO: 1.62 K/UL — SIGNIFICANT CHANGE UP (ref 1–3.3)
LYMPHOCYTES # BLD AUTO: 21.3 % — SIGNIFICANT CHANGE UP (ref 13–44)
MCHC RBC-ENTMCNC: 31.3 PG — SIGNIFICANT CHANGE UP (ref 27–34)
MCHC RBC-ENTMCNC: 34.1 GM/DL — SIGNIFICANT CHANGE UP (ref 32–36)
MCV RBC AUTO: 91.9 FL — SIGNIFICANT CHANGE UP (ref 80–100)
MONOCYTES # BLD AUTO: 0.66 K/UL — SIGNIFICANT CHANGE UP (ref 0–0.9)
MONOCYTES NFR BLD AUTO: 8.7 % — SIGNIFICANT CHANGE UP (ref 2–14)
NEUTROPHILS # BLD AUTO: 4.85 K/UL — SIGNIFICANT CHANGE UP (ref 1.8–7.4)
NEUTROPHILS NFR BLD AUTO: 63.9 % — SIGNIFICANT CHANGE UP (ref 43–77)
NRBC # BLD: 0 /100 WBCS — SIGNIFICANT CHANGE UP (ref 0–0)
PLATELET # BLD AUTO: 261 K/UL — SIGNIFICANT CHANGE UP (ref 150–400)
POTASSIUM SERPL-MCNC: 5.4 MMOL/L — HIGH (ref 3.5–5.3)
POTASSIUM SERPL-SCNC: 5.4 MMOL/L — HIGH (ref 3.5–5.3)
PROT SERPL-MCNC: 7.5 G/DL — SIGNIFICANT CHANGE UP (ref 6.4–8.2)
PROTHROM AB SERPL-ACNC: 11.9 SEC — SIGNIFICANT CHANGE UP (ref 9.5–13)
RBC # BLD: 3.83 M/UL — SIGNIFICANT CHANGE UP (ref 3.8–5.2)
RBC # FLD: 11.9 % — SIGNIFICANT CHANGE UP (ref 10.3–14.5)
SODIUM SERPL-SCNC: 133 MMOL/L — SIGNIFICANT CHANGE UP (ref 132–145)
WBC # BLD: 7.59 K/UL — SIGNIFICANT CHANGE UP (ref 3.8–10.5)
WBC # FLD AUTO: 7.59 K/UL — SIGNIFICANT CHANGE UP (ref 3.8–10.5)

## 2024-09-06 PROCEDURE — 99284 EMERGENCY DEPT VISIT MOD MDM: CPT

## 2024-09-06 NOTE — ED ADULT NURSE NOTE - NSFALLHARMRISKINTERV_ED_ALL_ED

## 2024-09-06 NOTE — ED ADULT TRIAGE NOTE - CHIEF COMPLAINT QUOTE
pt with CREST syndrome c/o burst capillary on face while showering at gym, bleeding resolved on arrival to er

## 2024-09-06 NOTE — ED PROVIDER NOTE - ATTENDING CONTRIBUTION TO CARE
Patient with h/o CREST presents with epistaxis for a hour today. No fever, ha, cp, sob, ap, n/v/d, lightheadedness.    Gen: NAD. HEENT: NCAT, mmm, nasal mucosa erythematous and hyperemic. Punctate bleeding from anterior right nasal septum.   Chest: RRR, nl S1 and S2, no m/r/g. Resp: CTAB, no w/r/r  Abd: nl BS, soft, nt/nd. Ext: Warm, dry  Neuro: CN II-XII intact, normal and equal strength, sensation, and reflexes bilaterally, normal gait, speech clear  Psych: AAOx3     MDM: bleeding cauterized. Patient observed without rebleeding. Labs normal. To f/u with ENT 1 week. Return to the ED immediately if getting worse, not improving, or if having any new or troubling symptoms.    Note resident wrote bleeding was in "distal" naris. To clarify, it was anterior.

## 2024-09-06 NOTE — ED ADULT NURSE NOTE - OBJECTIVE STATEMENT
Patient with h/o CREST syndrome c/o burst capillary at the tip of nose and was bleeding uncontrollably for 40 mins  while showering at gym, bleeding resolved on arrival to ER. patient takes aspirin, sildenafil, amlodipine, Plaquenil. Denies CP, dizziness, nausea, vomiting, confusion.

## 2024-09-06 NOTE — ED PROVIDER NOTE - NSFOLLOWUPINSTRUCTIONS_ED_ALL_ED_FT
YOU WERE SEEN IN THE ED FOR: bleeding    WHILE YOU WERE HERE, YOU HAD: labs, cauterization.     FOR PAIN, YOU MAY TAKE TYLENOL (Acetaminophen). FOLLOW THE INSTRUCTIONS ON THE LABEL/CONTAINER.    RETURN TO THE EMERGENCY DEPARTMENT IF YOU EXPERIENCE ANY NEW/CONCERNING/WORSENING SYMPTOMS SUCH AS BUT NOT LIMITED TO: bleeding, discharge, pain.    please do not swim for 48hrs after the procedure.

## 2024-09-06 NOTE — ED ADULT TRIAGE NOTE - ARRIVAL FROM
While we try to accommodate patient requests, our priority is to schedule treatment according to Doctor's orders and site availability. Does the Provider use the intake sheet or checkout note? What would be a preferred day of the week that would work best for your infusion appointment? Monday/ Wednesday. Do you prefer mornings or afternoons for your appointments? Mornings   Are there any days or dates that do not work for your schedule, including any upcoming vacations? Provider wants patient to start next week   We are going to try our best to schedule you at the infusion center closest to your home. In the event that we are unable to what would be your next preferred infusion site or sites? 1. [place] [facility]   Do you have transportation to take you to all of your appointments?  Yes   Would you like the infusion center to draw labs from your port? (disregard if patient doesn't have a port or need labs for infusion appointment) Yes [facility]

## 2024-09-06 NOTE — ED PROVIDER NOTE - PATIENT PORTAL LINK FT
You can access the FollowMyHealth Patient Portal offered by [facility] by registering at the following website: [email or web address]. By joining [employer]’s FollowMyHealth portal, you will also be able to view your health information using other applications (apps) compatible with our system.

## 2024-09-06 NOTE — ED PROVIDER NOTE - PROGRESS NOTE DETAILS
Patricia Mcclelland MD (PGY-3 EM): discussed w/ patient cauterization may lead to scars, patient would like to proceed w cauterization. Patricia Mcclelland MD (PGY-3 EM): discussed elevated potassium and risks w/ patient. patient will rpt as outpatient.

## 2024-09-06 NOTE — ED PROVIDER NOTE - CLINICAL SUMMARY MEDICAL DECISION MAKING FREE TEXT BOX
74 y/o w/ PMH of CREST syndrome, Raynaud's, sjogren's department. hypothyroidism, presents w/ burst capillaries on face s/p swimming and then showering. patient admitted to bleeding which wont stop. no nasal bleeding. PE: bleeding from 2 pinpoint capillaries at distal R nose, no blood in rostrals, evidence of capillaries to distal ext. will get screening labs for coag disorders vs anemia. patient is requestion cauterization. likely dc 72 y/o w/ PMH of CREST syndrome, Raynaud's, sjogren's department. hypothyroidism, presents w/ burst capillaries on face s/p swimming and then showering. patient admitted to bleeding which wont stop. no nasal bleeding. PE: bleeding from 2 pinpoint capillaries at distal R nose, no blood in rostrals, evidence of capillaries to distal ext. will get screening labs for coag disorders vs anemia. patient is requesting cauterization. likely dc

## 2025-04-16 ENCOUNTER — OUTPATIENT (OUTPATIENT)
Dept: OUTPATIENT SERVICES | Facility: HOSPITAL | Age: 75
LOS: 1 days | End: 2025-04-16
Payer: COMMERCIAL

## 2025-04-16 DIAGNOSIS — R06.09 OTHER FORMS OF DYSPNEA: ICD-10-CM

## 2025-04-16 PROCEDURE — 94452 HAST W/I&R PHYS/QHP: CPT | Mod: 26

## 2025-04-16 PROCEDURE — 94453 HAST W/SUPPL OXYGEN TITRJ: CPT

## 2025-06-19 NOTE — ED ADULT TRIAGE NOTE - WAS YOUR LAST COVID-19 VACCINE GREATER THAN OR EQUAL TO TWO MONTHS AGO?
MFM  sent a live well message to discuss service and resource connection. SW also added self to pt's MFM appt on 6/27 to offer services.  
Unknown